# Patient Record
Sex: MALE | Race: WHITE | Employment: OTHER | URBAN - NONMETROPOLITAN AREA
[De-identification: names, ages, dates, MRNs, and addresses within clinical notes are randomized per-mention and may not be internally consistent; named-entity substitution may affect disease eponyms.]

---

## 2018-02-02 ENCOUNTER — APPOINTMENT (OUTPATIENT)
Dept: GENERAL RADIOLOGY | Age: 53
End: 2018-02-02
Payer: COMMERCIAL

## 2018-02-02 ENCOUNTER — HOSPITAL ENCOUNTER (EMERGENCY)
Age: 53
Discharge: HOME | End: 2018-02-02
Attending: EMERGENCY MEDICINE
Payer: COMMERCIAL

## 2018-02-02 VITALS
BODY MASS INDEX: 37.1 KG/M2 | TEMPERATURE: 98.2 F | OXYGEN SATURATION: 100 % | SYSTOLIC BLOOD PRESSURE: 151 MMHG | DIASTOLIC BLOOD PRESSURE: 74 MMHG | HEIGHT: 74 IN | HEART RATE: 69 BPM | RESPIRATION RATE: 18 BRPM | WEIGHT: 289.1 LBS

## 2018-02-02 DIAGNOSIS — S92.425A CLOSED NONDISPLACED FRACTURE OF DISTAL PHALANX OF LEFT GREAT TOE, INITIAL ENCOUNTER: Primary | ICD-10-CM

## 2018-02-02 PROCEDURE — 29515 APPLICATION SHORT LEG SPLINT: CPT

## 2018-02-02 PROCEDURE — 6370000000 HC RX 637 (ALT 250 FOR IP): Performed by: EMERGENCY MEDICINE

## 2018-02-02 PROCEDURE — 99283 EMERGENCY DEPT VISIT LOW MDM: CPT

## 2018-02-02 PROCEDURE — 73630 X-RAY EXAM OF FOOT: CPT

## 2018-02-02 RX ORDER — HYDROCODONE BITARTRATE AND ACETAMINOPHEN 5; 325 MG/1; MG/1
1 TABLET ORAL ONCE
Status: COMPLETED | OUTPATIENT
Start: 2018-02-02 | End: 2018-02-02

## 2018-02-02 RX ORDER — DIVALPROEX SODIUM 500 MG/1
500 TABLET, DELAYED RELEASE ORAL 2 TIMES DAILY
COMMUNITY

## 2018-02-02 RX ORDER — FAMOTIDINE 20 MG/1
20 TABLET, FILM COATED ORAL 2 TIMES DAILY
COMMUNITY

## 2018-02-02 RX ORDER — CLOPIDOGREL BISULFATE 75 MG/1
75 TABLET ORAL DAILY
COMMUNITY

## 2018-02-02 RX ORDER — QUETIAPINE FUMARATE 100 MG/1
100 TABLET, FILM COATED ORAL DAILY
COMMUNITY

## 2018-02-02 RX ORDER — ATORVASTATIN CALCIUM 40 MG/1
40 TABLET, FILM COATED ORAL DAILY
COMMUNITY

## 2018-02-02 RX ORDER — HYDROCODONE BITARTRATE AND ACETAMINOPHEN 5; 325 MG/1; MG/1
1 TABLET ORAL EVERY 6 HOURS PRN
Qty: 6 TABLET | Refills: 0 | Status: SHIPPED | OUTPATIENT
Start: 2018-02-02 | End: 2018-02-09

## 2018-02-02 RX ORDER — ESCITALOPRAM OXALATE 10 MG/1
15 TABLET ORAL DAILY
COMMUNITY

## 2018-02-02 RX ADMIN — HYDROCODONE BITARTRATE AND ACETAMINOPHEN 1 TABLET: 5; 325 TABLET ORAL at 18:34

## 2018-02-02 ASSESSMENT — PAIN DESCRIPTION - LOCATION: LOCATION: FOOT

## 2018-02-02 ASSESSMENT — PAIN DESCRIPTION - PROGRESSION: CLINICAL_PROGRESSION: GRADUALLY WORSENING

## 2018-02-02 ASSESSMENT — PAIN DESCRIPTION - DESCRIPTORS: DESCRIPTORS: CONSTANT

## 2018-02-02 ASSESSMENT — PAIN DESCRIPTION - FREQUENCY: FREQUENCY: CONTINUOUS

## 2018-02-02 ASSESSMENT — PAIN DESCRIPTION - PAIN TYPE: TYPE: ACUTE PAIN

## 2018-02-02 ASSESSMENT — PAIN SCALES - GENERAL: PAINLEVEL_OUTOF10: 8

## 2018-02-02 ASSESSMENT — PAIN DESCRIPTION - ONSET: ONSET: ON-GOING

## 2018-02-02 ASSESSMENT — PAIN DESCRIPTION - ORIENTATION: ORIENTATION: LEFT

## 2018-02-02 NOTE — ED NOTES
Post op shoe sent home with patient per Dr. Kacey Ruiz due to patient refusing to use crutches.       Laila Bethea RN  02/02/18 [id number]

## 2018-02-02 NOTE — ED PROVIDER NOTES
eMERGENCY dEPARTMENT eNCOUnter        [address] [facility]    Chief Complaint   Patient presents with    Foot Injury     left foot injury from kicking a stove       HPI    Elvis Khoury is a 45 y.o. male who presents to ED from home. By car. With complaint of L foot injury. Onset Last night. Intensity of symptoms moderate. Location of symptoms L big toe. Patient states that he kicked a stove last night. REVIEW OF SYSTEMS    All systems reviewed and positives are in the HPI      PAST MEDICAL HISTORY    Past Medical History:   Diagnosis Date    Diabetes mellitus ([facility] [address]     PTSD (post-traumatic stress disorder)     Stroke ([facility] [address] 2017       SURGICAL HISTORY    Past Surgical History:   Procedure Laterality Date    EYE SURGERY      HERNIA REPAIR      JOINT REPLACEMENT         CURRENT MEDICATIONS    Current Outpatient Rx   Medication Sig Dispense Refill    aspirin 81 MG tablet Take 81 mg by mouth daily      vitamin D (CHOLECALCIFEROL) 1000 UNIT TABS tablet Take 1,000 Units by mouth daily      escitalopram (LEXAPRO) 10 MG tablet Take 15 mg by mouth daily      metFORMIN (GLUCOPHAGE) 1000 MG tablet Take 500 mg by mouth 2 times daily (with meals)      clopidogrel (PLAVIX) 75 MG tablet Take 75 mg by mouth daily      famotidine (PEPCID) 20 MG tablet Take 20 mg by mouth 2 times daily      QUEtiapine (SEROQUEL) 100 MG tablet Take 100 mg by mouth daily      atorvastatin (LIPITOR) 40 MG tablet Take 40 mg by mouth daily      divalproex (DEPAKOTE) 500 MG DR tablet Take 500 mg by mouth 2 times daily      HYDROcodone-acetaminophen (NORCO) 5-325 MG per tablet Take 1 tablet by mouth every 6 hours as needed for Pain for up to 7 days. 6 tablet 0       ALLERGIES    No Known Allergies    FAMILY HISTORY    History reviewed. No pertinent family history.     SOCIAL HISTORY    Social History     Social History    Marital status: N/A     Spouse name: N/A    Number of children: N/A    Years of education: N/A     Social History